# Patient Record
(demographics unavailable — no encounter records)

---

## 2025-01-31 NOTE — PHYSICAL EXAM
[Alert] : alert [Normocephalic] : normocephalic [Flat Open Anterior [place]] : flat open anterior fontanelle [Red Reflex] : red reflex bilateral [PERRL] : PERRL [Normally Placed Ears] : normally placed ears [Auricles Well Formed] : auricles well formed [Clear Tympanic membranes] : clear tympanic membranes [Light reflex present] : light reflex present [Bony landmarks visible] : bony landmarks visible [Nares Patent] : nares patent [Palate Intact] : palate intact [Uvula Midline] : uvula midline [Symmetric Chest Rise] : symmetric chest rise [Clear to Auscultation Bilaterally] : clear to auscultation bilaterally [Regular Rate and Rhythm] : regular rate and rhythm [S1, S2 present] : S1, S2 present [+2 Femoral Pulses] : (+) 2 femoral pulses [Soft] : soft [Bowel Sounds] : bowel sounds present [Central Urethral Opening] : central urethral opening [Testicles Descended] : testicles descended bilaterally [Patent] : patent [Normally Placed] : normally placed [No Abnormal Lymph Nodes Palpated] : no abnormal lymph nodes palpated [Startle Reflex] : startle reflex present [Plantar Grasp] : plantar grasp reflex present [Symmetric Celi] : symmetric celi [Acute Distress] : no acute distress [Discharge] : no discharge [Palpable Masses] : no palpable masses [Murmurs] : no murmurs [Tender] : nontender [Distended] : nondistended [Hepatomegaly] : no hepatomegaly [Splenomegaly] : no splenomegaly [Smith-Ortolani] : negative Smith-Ortolani [Allis Sign] : negative Allis sign [Spinal Dimple] : no spinal dimple [Tuft of Hair] : no tuft of hair [Rash or Lesions] : no rash/lesions

## 2025-01-31 NOTE — DEVELOPMENTAL MILESTONES
[Normal Development] : Normal Development [None] : none [Laughs aloud] : laughs aloud [Turns to voice] : turns to voice [Vocalizes with extending cooing] : vocalizes with extending cooing [Rolls over prone to supine] : rolls over prone to supine [Supports on elbows & wrists in prone] : supports on elbows and wrists in prone [Keeps hands unfisted] : keeps hands unfisted [Plays with fingers in midline] : plays with fingers in midline [Grasps objects] : grasps objects [FreeTextEntry1] : mother not available at appt to fill out screening

## 2025-01-31 NOTE — HISTORY OF PRESENT ILLNESS
[Parents] : parents [Well-balanced] : well-balanced [Formula ___ oz/feed] : [unfilled] oz of formula per feed [Formula ___ oz in 24hrs] : [unfilled] oz of formula in 24 hours [Normal] : Normal [___ voids per day] : [unfilled] voids per day [Frequency of stools: ___] : Frequency of stools: [unfilled]  stools [per day] : per day. [In Bassinet/Crib] : sleeps in bassinet/crib [On back] : sleeps on back [Sleeps 12-16 hours per 24 hours (including naps)] : sleeps 12-16 hours per 24 hours (including naps) [Tummy time] : tummy time [No] : No cigarette smoke exposure [Water heater temperature set at <120 degrees F] : Water heater temperature set at <120 degrees F [Rear facing car seat in back seat] : Rear facing car seat in back seat [Carbon Monoxide Detectors] : Carbon monoxide detectors at home [Smoke Detectors] : Smoke detectors at home. [Co-sleeping] : no co-sleeping [Loose bedding, pillow, toys, and/or bumpers in crib] : no loose bedding, pillow, toys, and/or bumpers in crib [FreeTextEntry7] : Doing well [de-identified] : None [FreeTextEntry1] : 4 month old baby boy here for routine PE. First visit in our office. Doing well. No current concerns. Bottle feeding well with good po/uop/bm. Normal sleep and activity. Starting to roll, bears weight, laughs. Growth and development wnl.

## 2025-05-02 NOTE — DISCUSSION/SUMMARY
[Family Functioning] : family functioning [Nutrition and Feeding] : nutrition and feeding [Infant Development] : infant development [Oral Health] : oral health [Safety] : safety [de-identified] : D/w parent sleep training and methods to assist w/sleep [] : The components of the vaccine(s) to be administered today are listed in the plan of care. The disease(s) for which the vaccine(s) are intended to prevent and the risks have been discussed with the caretaker.  The risks are also included in the appropriate vaccination information statements which have been provided to the patient's caregiver.  The caregiver has given consent to vaccinate. [FreeTextEntry1] :  Pentacel, Prevanr, and Rotateq given by LPN w/out adverse event Introduce allergenic foods such as eggs, nuts, peanut butter, and seafood Feeding discussed. Introduce single-ingredient foods rich in iron, one at a time.  Incorporate up to 4 oz of fluorinated water daily in a sippy cup Lead screen reviewed, no concerning factors When teeth erupt wipe daily with washcloth When in car, patient should be in rear-facing car seat in back seat Put baby to sleep on back, in own crib with no loose or soft bedding Help baby to maintain sleep and feeding routines Continue tummy time when awake Ensure home is safe since baby is now more mobile.  Read aloud to baby. Age-appropriate guidance given Return for 9M well visit, sooner PRN

## 2025-05-02 NOTE — PHYSICAL EXAM
[Alert] : alert [Normocephalic] : normocephalic [Flat Open Anterior [place]] : flat open anterior fontanelle [Red Reflex] : red reflex bilateral [PERRL] : PERRL [Normally Placed Ears] : normally placed ears [Auricles Well Formed] : auricles well formed [Clear Tympanic membranes] : clear tympanic membranes [Light reflex present] : light reflex present [Bony landmarks visible] : bony landmarks visible [Nares Patent] : nares patent [Palate Intact] : palate intact [Uvula Midline] : uvula midline [Supple, full passive range of motion] : supple, full passive range of motion [Symmetric Chest Rise] : symmetric chest rise [Clear to Auscultation Bilaterally] : clear to auscultation bilaterally [Regular Rate and Rhythm] : regular rate and rhythm [S1, S2 present] : S1, S2 present [+2 Femoral Pulses] : (+) 2 femoral pulses [Soft] : soft [Bowel Sounds] : bowel sounds present [Central Urethral Opening] : central urethral opening [Testicles Descended] : testicles descended bilaterally [Patent] : patent [Normally Placed] : normally placed [No Abnormal Lymph Nodes Palpated] : no abnormal lymph nodes palpated [Symmetric Buttocks Creases] : symmetric buttocks creases [Plantar Grasp] : plantar grasp reflex present [Cranial Nerves Grossly Intact] : cranial nerves grossly intact [Acute Distress] : no acute distress [Discharge] : no discharge [Tooth Eruption] : no tooth eruption [Palpable Masses] : no palpable masses [Murmurs] : no murmurs [Tender] : nontender [Distended] : nondistended [Hepatomegaly] : no hepatomegaly [Splenomegaly] : no splenomegaly [Smith-Ortolani] : negative Smith-Ortolani [Allis Sign] : negative Allis sign [Spinal Dimple] : no spinal dimple [Tuft of Hair] : no tuft of hair [Rash or Lesions] : no rash/lesions [de-identified] : nevus simplex of back of neck

## 2025-05-02 NOTE — HISTORY OF PRESENT ILLNESS
[Father] : father [Formula ___ oz/feed] : [unfilled] oz of formula per feed [___ Feeding per 24 hrs] : a  total of [unfilled] feedings in 24 hours [Fruits] : fruits [Vegetables] : vegetables [Egg] : egg [Normal] : Normal [In Bassinet/Crib] : sleeps in bassinet/crib [Rear facing car seat in back seat] : Rear facing car seat in back seat [Smoke Detectors] : Smoke detectors at home. [Co-sleeping] : no co-sleeping [Loose bedding, pillow, toys, and/or bumpers in crib] : no loose bedding, pillow, toys, and/or bumpers in crib [FreeTextEntry1] : 7 m old here for routine care  Parent expressing concerns that pt wakes up frequently before midnight and waking up early. Has good time naps. Still sleeps in bassinet in parents room, planning to transition to crib in own room this week.   Pt doing well with feeds; has started solids, having some egg. Eating solids 2x daily Regular BMs, no concerns with constipation, voiding well  No developmental concerns: babbles, + raking

## 2025-05-02 NOTE — DEVELOPMENTAL MILESTONES
[Normal Development] : Normal Development [Pats or smiles at reflection] : pats or smiles at reflection [Babbles] : babbles [Rolls over prone to supine] : rolls over prone to supine [Rakes small object with 4 fingers] : rakes small object with 4 fingers [FreeTextEntry1] : Mother not available to complete assesssment

## 2025-07-01 NOTE — HISTORY OF PRESENT ILLNESS
[Father] : father [Formula ___ oz/feed] : [unfilled] oz of formula per feed [Hours between feeds ___] : Child is fed every [unfilled] hours [Fruit] : fruit [Vegetables] : vegetables [Eggs] : eggs [Dairy] : dairy [Water] : water [Normal] : Normal [Wakes up at night] : wakes up at night [Sippy Cup use] : sippy cup use [No] : No cigarette smoke exposure [Rear facing car seat in  back seat] : Rear facing car seat in  back seat [Smoke Detectors] : Smoke detectors [Hepatitis B] : Hepatitis B [Co-sleeping] : no co-sleeping [Loose bedding, pillow, toys, and/or bumpers in crib] : no loose bedding, pillow, toys, and/or bumpers in crib [FreeTextEntry1] : 9 m old here for routine care  Pt with recent URI last week. No fever, some cough now resolved. Was playful during illness.   Pt doing well with feeds; having solids more than 3 x a day, eating allergenic foods, planning to trial PB Regular BMs, no concerns with constipation, voiding well  No developmental concerns: + pincer, saying "umer" non-specifically, starting to gesture, pulls up to stand, starting to cruise

## 2025-07-01 NOTE — DEVELOPMENTAL MILESTONES
[Normal Development] : Normal Development [Uses basic gestures] : uses basic gestures [Says "Landon" or "Mama"] : says "Landon" or "Mama" nonspecifically [Sits well without support] : sits well without support [Transitions between sitting and lying] : transitions between sitting and lying [Yes] : Completed. [FreeTextEntry1] : No developmental concerns

## 2025-07-01 NOTE — PHYSICAL EXAM
[Alert] : alert [Normocephalic] : normocephalic [Flat Open Anterior [place]] : flat open anterior fontanelle [Red Reflex] : red reflex bilateral [PERRL] : PERRL [Normally Placed Ears] : normally placed ears [Auricles Well Formed] : auricles well formed [Clear Tympanic membranes] : clear tympanic membranes [Light reflex present] : light reflex present [Bony landmarks visible] : bony landmarks visible [Nares Patent] : nares patent [Palate Intact] : palate intact [Uvula Midline] : uvula midline [Supple, full passive range of motion] : supple, full passive range of motion [Symmetric Chest Rise] : symmetric chest rise [Clear to Auscultation Bilaterally] : clear to auscultation bilaterally [Regular Rate and Rhythm] : regular rate and rhythm [S1, S2 present] : S1, S2 present [+2 Femoral Pulses] : (+) 2 femoral pulses [Soft] : soft [Bowel Sounds] : bowel sounds present [Normal External Genitalia] : normal external genitalia [Central Urethral Opening] : central urethral opening [No Abnormal Lymph Nodes Palpated] : no abnormal lymph nodes palpated [Symmetric Abduction and Rotation of hips] : symmetric abduction and rotation of hips [Symmetric Buttocks Creases] : symmetric buttocks creases [Straight] : straight [Cranial Nerves Grossly Intact] : cranial nerves grossly intact [Acute Distress] : no acute distress [Excessive Tearing] : no excessive tearing [Discharge] : no discharge [Tooth Eruption] : no tooth eruption [Palpable Masses] : no palpable masses [Murmurs] : no murmurs [Tender] : nontender [Distended] : nondistended [Hepatomegaly] : no hepatomegaly [Splenomegaly] : no splenomegaly [Rash or Lesions] : no rash/lesions [de-identified] : LT retractile testicle able to position into scrotum, RT testicle descended

## 2025-07-01 NOTE — DISCUSSION/SUMMARY
[Normal Growth] : growth [ Infant] :  infant [Family Adaptation] : family adaptation [Infant [place]] : infant independence [Feeding Routine] : feeding routine [Safety] : safety [FreeTextEntry1] :   Hep B discussed, parent would like to defer to separate visit given recent illness Age-appropriate anticipatory guidance given  SWYC reviewed, no developmental concerns Continue allergenic foods such as eggs, nuts, peanut butter, and seafood Continue formula feeds, introduce single-ingredient foods rich in iron, one at a time.  Incorporate up to 4 oz of fluorinated water daily in a sippy cup When teeth erupt wipe daily with washcloth When in car, patient should be in rear-facing car seat in back seat Put baby to sleep on back, in own crib with no loose or soft bedding Help baby to maintain sleep and feeding routines Ensure home is safe since baby is now more mobile.  Read aloud to baby. Age-appropriate guidance given Return for 12 M checkup, sooner PRN